# Patient Record
Sex: FEMALE | Race: BLACK OR AFRICAN AMERICAN | NOT HISPANIC OR LATINO | Employment: FULL TIME | URBAN - METROPOLITAN AREA
[De-identification: names, ages, dates, MRNs, and addresses within clinical notes are randomized per-mention and may not be internally consistent; named-entity substitution may affect disease eponyms.]

---

## 2017-06-01 ENCOUNTER — APPOINTMENT (OUTPATIENT)
Dept: GENERAL RADIOLOGY | Facility: HOSPITAL | Age: 27
End: 2017-06-01

## 2017-06-01 ENCOUNTER — HOSPITAL ENCOUNTER (EMERGENCY)
Facility: HOSPITAL | Age: 27
Discharge: HOME | End: 2017-06-01
Attending: EMERGENCY MEDICINE | Admitting: EMERGENCY MEDICINE

## 2017-06-01 VITALS
SYSTOLIC BLOOD PRESSURE: 111 MMHG | BODY MASS INDEX: 32.78 KG/M2 | OXYGEN SATURATION: 99 % | RESPIRATION RATE: 16 BRPM | HEIGHT: 63 IN | WEIGHT: 185 LBS | DIASTOLIC BLOOD PRESSURE: 78 MMHG | TEMPERATURE: 99.2 F | HEART RATE: 80 BPM

## 2017-06-01 DIAGNOSIS — N30.90 CYSTITIS: ICD-10-CM

## 2017-06-01 DIAGNOSIS — J06.9 UPPER RESPIRATORY TRACT INFECTION, UNSPECIFIED TYPE: Primary | ICD-10-CM

## 2017-06-01 LAB
ALBUMIN SERPL-MCNC: 4.3 G/DL (ref 3.2–4.8)
ALBUMIN/GLOB SERPL: 1.4 G/DL (ref 1.5–2.5)
ALP SERPL-CCNC: 45 U/L (ref 25–100)
ALT SERPL W P-5'-P-CCNC: 14 U/L (ref 7–40)
ANION GAP SERPL CALCULATED.3IONS-SCNC: 2 MMOL/L (ref 3–11)
AST SERPL-CCNC: 18 U/L (ref 0–33)
B-HCG UR QL: NEGATIVE
BACTERIA UR QL AUTO: ABNORMAL /HPF
BASOPHILS # BLD AUTO: 0.02 10*3/MM3 (ref 0–0.2)
BASOPHILS NFR BLD AUTO: 0.4 % (ref 0–1)
BILIRUB SERPL-MCNC: 0.5 MG/DL (ref 0.3–1.2)
BILIRUB UR QL STRIP: NEGATIVE
BUN BLD-MCNC: 9 MG/DL (ref 9–23)
BUN/CREAT SERPL: 12.9 (ref 7–25)
CALCIUM SPEC-SCNC: 9.3 MG/DL (ref 8.7–10.4)
CHLORIDE SERPL-SCNC: 108 MMOL/L (ref 99–109)
CLARITY UR: ABNORMAL
CO2 SERPL-SCNC: 24 MMOL/L (ref 20–31)
COLOR UR: YELLOW
CREAT BLD-MCNC: 0.7 MG/DL (ref 0.6–1.3)
DEPRECATED RDW RBC AUTO: 45.2 FL (ref 37–54)
EOSINOPHIL # BLD AUTO: 0.12 10*3/MM3 (ref 0.1–0.3)
EOSINOPHIL NFR BLD AUTO: 2.4 % (ref 0–3)
ERYTHROCYTE [DISTWIDTH] IN BLOOD BY AUTOMATED COUNT: 13.3 % (ref 11.3–14.5)
FLUAV AG NPH QL: NEGATIVE
FLUBV AG NPH QL IA: NEGATIVE
GFR SERPL CREATININE-BSD FRML MDRD: 121 ML/MIN/1.73
GLOBULIN UR ELPH-MCNC: 3.1 GM/DL
GLUCOSE BLD-MCNC: 104 MG/DL (ref 70–100)
GLUCOSE UR STRIP-MCNC: NEGATIVE MG/DL
HCT VFR BLD AUTO: 41.1 % (ref 34.5–44)
HGB BLD-MCNC: 13.5 G/DL (ref 11.5–15.5)
HGB UR QL STRIP.AUTO: NEGATIVE
HYALINE CASTS UR QL AUTO: ABNORMAL /LPF
IMM GRANULOCYTES # BLD: 0 10*3/MM3 (ref 0–0.03)
IMM GRANULOCYTES NFR BLD: 0 % (ref 0–0.6)
INTERNAL NEGATIVE CONTROL: NEGATIVE
INTERNAL POSITIVE CONTROL: POSITIVE
KETONES UR QL STRIP: ABNORMAL
LEUKOCYTE ESTERASE UR QL STRIP.AUTO: ABNORMAL
LYMPHOCYTES # BLD AUTO: 1.88 10*3/MM3 (ref 0.6–4.8)
LYMPHOCYTES NFR BLD AUTO: 37.9 % (ref 24–44)
Lab: NORMAL
MCH RBC QN AUTO: 30.2 PG (ref 27–31)
MCHC RBC AUTO-ENTMCNC: 32.8 G/DL (ref 32–36)
MCV RBC AUTO: 91.9 FL (ref 80–99)
MONOCYTES # BLD AUTO: 0.42 10*3/MM3 (ref 0–1)
MONOCYTES NFR BLD AUTO: 8.5 % (ref 0–12)
NEUTROPHILS # BLD AUTO: 2.52 10*3/MM3 (ref 1.5–8.3)
NEUTROPHILS NFR BLD AUTO: 50.8 % (ref 41–71)
NITRITE UR QL STRIP: NEGATIVE
PH UR STRIP.AUTO: 6 [PH] (ref 5–8)
PLATELET # BLD AUTO: 199 10*3/MM3 (ref 150–450)
PMV BLD AUTO: 9.8 FL (ref 6–12)
POTASSIUM BLD-SCNC: 3.7 MMOL/L (ref 3.5–5.5)
PROT SERPL-MCNC: 7.4 G/DL (ref 5.7–8.2)
PROT UR QL STRIP: NEGATIVE
RBC # BLD AUTO: 4.47 10*6/MM3 (ref 3.89–5.14)
RBC # UR: ABNORMAL /HPF
REF LAB TEST METHOD: ABNORMAL
S PYO AG THROAT QL: NEGATIVE
SODIUM BLD-SCNC: 134 MMOL/L (ref 132–146)
SP GR UR STRIP: 1.02 (ref 1–1.03)
SQUAMOUS #/AREA URNS HPF: ABNORMAL /HPF
UROBILINOGEN UR QL STRIP: ABNORMAL
WBC NRBC COR # BLD: 4.96 10*3/MM3 (ref 3.5–10.8)
WBC UR QL AUTO: ABNORMAL /HPF

## 2017-06-01 PROCEDURE — 99284 EMERGENCY DEPT VISIT MOD MDM: CPT

## 2017-06-01 PROCEDURE — 94640 AIRWAY INHALATION TREATMENT: CPT

## 2017-06-01 PROCEDURE — 85025 COMPLETE CBC W/AUTO DIFF WBC: CPT | Performed by: NURSE PRACTITIONER

## 2017-06-01 PROCEDURE — 87086 URINE CULTURE/COLONY COUNT: CPT | Performed by: NURSE PRACTITIONER

## 2017-06-01 PROCEDURE — 87880 STREP A ASSAY W/OPTIC: CPT | Performed by: NURSE PRACTITIONER

## 2017-06-01 PROCEDURE — 99283 EMERGENCY DEPT VISIT LOW MDM: CPT

## 2017-06-01 PROCEDURE — 87804 INFLUENZA ASSAY W/OPTIC: CPT | Performed by: NURSE PRACTITIONER

## 2017-06-01 PROCEDURE — 81001 URINALYSIS AUTO W/SCOPE: CPT | Performed by: NURSE PRACTITIONER

## 2017-06-01 PROCEDURE — 80053 COMPREHEN METABOLIC PANEL: CPT | Performed by: NURSE PRACTITIONER

## 2017-06-01 PROCEDURE — 71020 HC CHEST PA AND LATERAL: CPT

## 2017-06-01 PROCEDURE — 87081 CULTURE SCREEN ONLY: CPT | Performed by: NURSE PRACTITIONER

## 2017-06-01 RX ORDER — IPRATROPIUM BROMIDE AND ALBUTEROL SULFATE 2.5; .5 MG/3ML; MG/3ML
3 SOLUTION RESPIRATORY (INHALATION) ONCE
Status: COMPLETED | OUTPATIENT
Start: 2017-06-01 | End: 2017-06-01

## 2017-06-01 RX ORDER — CEFDINIR 300 MG/1
300 CAPSULE ORAL 2 TIMES DAILY
Qty: 20 CAPSULE | Refills: 0 | Status: SHIPPED | OUTPATIENT
Start: 2017-06-01 | End: 2019-10-10

## 2017-06-01 RX ORDER — SODIUM CHLORIDE 0.9 % (FLUSH) 0.9 %
10 SYRINGE (ML) INJECTION AS NEEDED
Status: DISCONTINUED | OUTPATIENT
Start: 2017-06-01 | End: 2017-06-01 | Stop reason: HOSPADM

## 2017-06-01 RX ADMIN — SODIUM CHLORIDE 1000 ML: 9 INJECTION, SOLUTION INTRAVENOUS at 10:19

## 2017-06-01 RX ADMIN — IPRATROPIUM BROMIDE AND ALBUTEROL SULFATE 3 ML: .5; 3 SOLUTION RESPIRATORY (INHALATION) at 09:42

## 2017-06-01 NOTE — DISCHARGE INSTRUCTIONS
Follow up with one of the [facility] physician groups below to setup primary care. If you have trouble following up, please call Audrey Benites, our transitional care nurse, at [phone].    (Dr. Mojica, Dr. Gibbs, Dr. Damon, and Dr. Ramon.)  [facility], Primary Care, [phone], [address], [place] [address]    [facility], Primary Care, [phone], [address] [facility], [address] [place], [address] [facility]) [facility] Medical [facility], Primary Care, [phone], [address] [facility] [address] [facility], [address] [place], [address] [facility], Primary Care, [phone], [address] [facility], [address] [place], [address]     [place] [address] [facility] Medical [facility], Primary Care, [phone], [address] [facility], [address] [place], [address]    [place] 2 [facility], Primary Care, [phone], [address], [place], [address]    [place], [address] [facility], Primary Care, [phone], [address] [facility], [address] [place], [address] [facility] Medical [facility], Primary Care, [phone], [address] [facility], [address] [place], [address] [facility]) [facility] Medical [facility], Primary Care, [phone], [address], [address] [place], [address] [facility] Medical [facility], Primary Care, [phone], [address] [facility], [address] [place], [address] [facility], Primary Care, [phone], [address] [facility], [place], [address]     [facility], Primary Care, [phone], [address] [place] [address]    Follow up with one of the physician centers below to setup primary care.    [facility], [phone],  [address] [facility] [address] [facility], [address], [place], [facility]    [facility], [phone], [address] [facility], [address] [place] [address], [phone], [address] [facility] [address] [place], [facility];     [facility], [phone], [address] [place] [address]

## 2017-06-01 NOTE — ED PROVIDER NOTES
Subjective   Patient is a 26 y.o. female presenting with URI.   URI   Presenting symptoms: congestion, cough, ear pain, fever and sore throat    Severity:  Moderate  Onset quality:  Gradual  Timing:  Constant  Progression:  Worsening  Chronicity:  New  Worsened by:  Movement  Associated symptoms: sinus pain, sneezing and wheezing    Associated symptoms: no headaches and no neck pain    Risk factors: sick contacts        Review of Systems   Constitutional: Positive for fever.   HENT: Positive for congestion, ear pain, sneezing and sore throat.    Respiratory: Positive for cough and wheezing.    Musculoskeletal: Negative for neck pain.   Neurological: Negative for headaches.   All other systems reviewed and are negative.      Past Medical History:   Diagnosis Date   • Depression    • Heart disease        No Known Allergies    Past Surgical History:   Procedure Laterality Date   •  SECTION     • TUMOR REMOVAL      NECK   • TUMOR REMOVAL         Family History   Problem Relation Age of Onset   • Diabetes Father    • Thyroid disease Father    • Sickle cell anemia Father        Social History     Social History   • Marital status: Single     Spouse name: N/A   • Number of children: N/A   • Years of education: N/A     Social History Main Topics   • Smoking status: Current Some Day Smoker     Packs/day: 0.50   • Smokeless tobacco: None      Comment: social   • Alcohol use Yes      Comment: RARELY   • Drug use: Yes     Special: Marijuana      Comment: OCCASIONAL- NOT IN 6+MONTHS   • Sexual activity: Yes     Partners: Male     Other Topics Concern   • None     Social History Narrative           Objective   Physical Exam   Constitutional: She is oriented to person, place, and time. She appears well-developed and well-nourished.   HENT:   Head: Normocephalic and atraumatic.   Right Ear: External ear normal. Tympanic membrane is bulging.   Left Ear: External ear normal. Tympanic membrane is bulging.   Nose: Rhinorrhea  present. Right sinus exhibits maxillary sinus tenderness and frontal sinus tenderness. Left sinus exhibits maxillary sinus tenderness and frontal sinus tenderness.   Mouth/Throat: Oropharynx is clear and moist.   Eyes: Conjunctivae and EOM are normal. Pupils are equal, round, and reactive to light.   Neck: Normal range of motion. Neck supple.   Cardiovascular: Normal rate, regular rhythm, normal heart sounds and intact distal pulses.    Pulmonary/Chest: Effort normal. She has wheezes.   Abdominal: Soft. Bowel sounds are normal.   Musculoskeletal: Normal range of motion.   Neurological: She is alert and oriented to person, place, and time.   Skin: Skin is warm and dry.   Psychiatric: She has a normal mood and affect. Her behavior is normal. Judgment and thought content normal.       Procedures         ED Course  ED Course   Comment By Time   Well aware of the ss of worsening condition. All thankful and agreeable. BETHANY Rodriguez 06/01 1129                  [facility]    Final diagnoses:   Upper respiratory tract infection, unspecified type   Cystitis            BETHANY Rodriguez  06/01/17 1132

## 2017-06-03 LAB
BACTERIA SPEC AEROBE CULT: NORMAL
BACTERIA SPEC AEROBE CULT: NORMAL

## 2017-07-31 ENCOUNTER — HOSPITAL ENCOUNTER (EMERGENCY)
Facility: HOSPITAL | Age: 27
Discharge: HOME | End: 2017-07-31
Attending: EMERGENCY MEDICINE | Admitting: EMERGENCY MEDICINE

## 2017-07-31 ENCOUNTER — APPOINTMENT (OUTPATIENT)
Dept: CT IMAGING | Facility: HOSPITAL | Age: 27
End: 2017-07-31

## 2017-07-31 VITALS
TEMPERATURE: 98.7 F | SYSTOLIC BLOOD PRESSURE: 94 MMHG | BODY MASS INDEX: 31.01 KG/M2 | OXYGEN SATURATION: 100 % | HEIGHT: 63 IN | HEART RATE: 65 BPM | RESPIRATION RATE: 16 BRPM | DIASTOLIC BLOOD PRESSURE: 51 MMHG | WEIGHT: 175 LBS

## 2017-07-31 DIAGNOSIS — R42 ORTHOSTATIC DIZZINESS: Primary | ICD-10-CM

## 2017-07-31 LAB
ALBUMIN SERPL-MCNC: 4.1 G/DL (ref 3.2–4.8)
ALBUMIN/GLOB SERPL: 1.4 G/DL (ref 1.5–2.5)
ALP SERPL-CCNC: 41 U/L (ref 25–100)
ALT SERPL W P-5'-P-CCNC: 14 U/L (ref 7–40)
ANION GAP SERPL CALCULATED.3IONS-SCNC: 4 MMOL/L (ref 3–11)
AST SERPL-CCNC: 12 U/L (ref 0–33)
B-HCG UR QL: NEGATIVE
BACTERIA UR QL AUTO: ABNORMAL /HPF
BASOPHILS # BLD AUTO: 0.01 10*3/MM3 (ref 0–0.2)
BASOPHILS NFR BLD AUTO: 0.2 % (ref 0–1)
BILIRUB SERPL-MCNC: 0.3 MG/DL (ref 0.3–1.2)
BILIRUB UR QL STRIP: NEGATIVE
BUN BLD-MCNC: 8 MG/DL (ref 9–23)
BUN/CREAT SERPL: 13.3 (ref 7–25)
CALCIUM SPEC-SCNC: 8.9 MG/DL (ref 8.7–10.4)
CHLORIDE SERPL-SCNC: 111 MMOL/L (ref 99–109)
CLARITY UR: CLEAR
CO2 SERPL-SCNC: 26 MMOL/L (ref 20–31)
COLOR UR: YELLOW
CREAT BLD-MCNC: 0.6 MG/DL (ref 0.6–1.3)
DEPRECATED RDW RBC AUTO: 44.2 FL (ref 37–54)
EOSINOPHIL # BLD AUTO: 0.2 10*3/MM3 (ref 0–0.3)
EOSINOPHIL NFR BLD AUTO: 3.8 % (ref 0–3)
ERYTHROCYTE [DISTWIDTH] IN BLOOD BY AUTOMATED COUNT: 13.1 % (ref 11.3–14.5)
GFR SERPL CREATININE-BSD FRML MDRD: 144 ML/MIN/1.73
GLOBULIN UR ELPH-MCNC: 3 GM/DL
GLUCOSE BLD-MCNC: 96 MG/DL (ref 70–100)
GLUCOSE UR STRIP-MCNC: NEGATIVE MG/DL
HCT VFR BLD AUTO: 39 % (ref 34.5–44)
HGB BLD-MCNC: 12.9 G/DL (ref 11.5–15.5)
HGB UR QL STRIP.AUTO: NEGATIVE
HOLD SPECIMEN: NORMAL
HOLD SPECIMEN: NORMAL
HYALINE CASTS UR QL AUTO: ABNORMAL /LPF
IMM GRANULOCYTES # BLD: 0 10*3/MM3 (ref 0–0.03)
IMM GRANULOCYTES NFR BLD: 0 % (ref 0–0.6)
INTERNAL NEGATIVE CONTROL: NEGATIVE
INTERNAL POSITIVE CONTROL: POSITIVE
KETONES UR QL STRIP: NEGATIVE
LEUKOCYTE ESTERASE UR QL STRIP.AUTO: ABNORMAL
LYMPHOCYTES # BLD AUTO: 1.82 10*3/MM3 (ref 0.6–4.8)
LYMPHOCYTES NFR BLD AUTO: 34.3 % (ref 24–44)
Lab: NORMAL
MCH RBC QN AUTO: 30.5 PG (ref 27–31)
MCHC RBC AUTO-ENTMCNC: 33.1 G/DL (ref 32–36)
MCV RBC AUTO: 92.2 FL (ref 80–99)
MONOCYTES # BLD AUTO: 0.35 10*3/MM3 (ref 0–1)
MONOCYTES NFR BLD AUTO: 6.6 % (ref 0–12)
NEUTROPHILS # BLD AUTO: 2.92 10*3/MM3 (ref 1.5–8.3)
NEUTROPHILS NFR BLD AUTO: 55.1 % (ref 41–71)
NITRITE UR QL STRIP: NEGATIVE
PH UR STRIP.AUTO: 7.5 [PH] (ref 5–8)
PLATELET # BLD AUTO: 218 10*3/MM3 (ref 150–450)
PMV BLD AUTO: 10.6 FL (ref 6–12)
POTASSIUM BLD-SCNC: 3.8 MMOL/L (ref 3.5–5.5)
PROT SERPL-MCNC: 7.1 G/DL (ref 5.7–8.2)
PROT UR QL STRIP: NEGATIVE
RBC # BLD AUTO: 4.23 10*6/MM3 (ref 3.89–5.14)
RBC # UR: ABNORMAL /HPF
REF LAB TEST METHOD: ABNORMAL
SODIUM BLD-SCNC: 141 MMOL/L (ref 132–146)
SP GR UR STRIP: 1.01 (ref 1–1.03)
SQUAMOUS #/AREA URNS HPF: ABNORMAL /HPF
TROPONIN I SERPL-MCNC: 0 NG/ML (ref 0–0.07)
UROBILINOGEN UR QL STRIP: ABNORMAL
WBC NRBC COR # BLD: 5.3 10*3/MM3 (ref 3.5–10.8)
WBC UR QL AUTO: ABNORMAL /HPF
WHOLE BLOOD HOLD SPECIMEN: NORMAL
WHOLE BLOOD HOLD SPECIMEN: NORMAL

## 2017-07-31 PROCEDURE — 93005 ELECTROCARDIOGRAM TRACING: CPT | Performed by: EMERGENCY MEDICINE

## 2017-07-31 PROCEDURE — 96360 HYDRATION IV INFUSION INIT: CPT

## 2017-07-31 PROCEDURE — 99284 EMERGENCY DEPT VISIT MOD MDM: CPT

## 2017-07-31 PROCEDURE — 70450 CT HEAD/BRAIN W/O DYE: CPT

## 2017-07-31 PROCEDURE — 84484 ASSAY OF TROPONIN QUANT: CPT

## 2017-07-31 PROCEDURE — 87086 URINE CULTURE/COLONY COUNT: CPT | Performed by: NURSE PRACTITIONER

## 2017-07-31 PROCEDURE — 80053 COMPREHEN METABOLIC PANEL: CPT | Performed by: NURSE PRACTITIONER

## 2017-07-31 PROCEDURE — 85025 COMPLETE CBC W/AUTO DIFF WBC: CPT | Performed by: NURSE PRACTITIONER

## 2017-07-31 PROCEDURE — 81001 URINALYSIS AUTO W/SCOPE: CPT | Performed by: NURSE PRACTITIONER

## 2017-07-31 PROCEDURE — 96361 HYDRATE IV INFUSION ADD-ON: CPT

## 2017-07-31 RX ADMIN — SODIUM CHLORIDE 1000 ML: 9 INJECTION, SOLUTION INTRAVENOUS at 11:36

## 2017-08-02 LAB — BACTERIA SPEC AEROBE CULT: NORMAL

## 2017-09-13 ENCOUNTER — HOSPITAL ENCOUNTER (EMERGENCY)
Facility: HOSPITAL | Age: 27
Discharge: HOME | End: 2017-09-13
Attending: EMERGENCY MEDICINE | Admitting: EMERGENCY MEDICINE

## 2017-09-13 ENCOUNTER — APPOINTMENT (OUTPATIENT)
Dept: GENERAL RADIOLOGY | Facility: HOSPITAL | Age: 27
End: 2017-09-13

## 2017-09-13 ENCOUNTER — HOSPITAL ENCOUNTER (EMERGENCY)
Facility: HOSPITAL | Age: 27
End: 2017-09-13

## 2017-09-13 VITALS
RESPIRATION RATE: 18 BRPM | HEART RATE: 75 BPM | TEMPERATURE: 98.6 F | BODY MASS INDEX: 29.23 KG/M2 | WEIGHT: 165 LBS | SYSTOLIC BLOOD PRESSURE: 110 MMHG | DIASTOLIC BLOOD PRESSURE: 88 MMHG | HEIGHT: 63 IN | OXYGEN SATURATION: 98 %

## 2017-09-13 DIAGNOSIS — M54.32 SCIATICA OF LEFT SIDE: Primary | ICD-10-CM

## 2017-09-13 LAB
B-HCG UR QL: NEGATIVE
BILIRUB BLD-MCNC: NEGATIVE MG/DL
CLARITY, POC: ABNORMAL
COLOR UR: YELLOW
GLUCOSE UR STRIP-MCNC: NEGATIVE MG/DL
INTERNAL NEGATIVE CONTROL: NEGATIVE
INTERNAL POSITIVE CONTROL: POSITIVE
KETONES UR QL: NEGATIVE
LEUKOCYTE EST, POC: NEGATIVE
Lab: NORMAL
NITRITE UR-MCNC: POSITIVE MG/ML
PH UR: 8.5 [PH] (ref 5–8)
PROT UR STRIP-MCNC: NEGATIVE MG/DL
RBC # UR STRIP: NEGATIVE /UL
SP GR UR: 1.01 (ref 1–1.03)
UROBILINOGEN UR QL: NORMAL

## 2017-09-13 PROCEDURE — 81002 URINALYSIS NONAUTO W/O SCOPE: CPT | Performed by: EMERGENCY MEDICINE

## 2017-09-13 PROCEDURE — 99283 EMERGENCY DEPT VISIT LOW MDM: CPT

## 2017-09-13 PROCEDURE — 72100 X-RAY EXAM L-S SPINE 2/3 VWS: CPT

## 2017-09-13 PROCEDURE — 73502 X-RAY EXAM HIP UNI 2-3 VIEWS: CPT

## 2017-09-13 RX ORDER — CYCLOBENZAPRINE HCL 10 MG
10 TABLET ORAL 3 TIMES DAILY PRN
Qty: 12 TABLET | Refills: 0 | Status: SHIPPED | OUTPATIENT
Start: 2017-09-13

## 2017-09-13 RX ORDER — PREDNISONE 20 MG/1
TABLET ORAL
Qty: 18 TABLET | Refills: 0 | Status: SHIPPED | OUTPATIENT
Start: 2017-09-13 | End: 2019-10-10

## 2017-09-13 NOTE — ED PROVIDER NOTES
Subjective   Patient is a 27 y.o. female presenting with back pain.   History provided by:  Patient   used: No    Back Pain   Location:  Thoracic spine and gluteal region  Quality:  Burning, shooting and stabbing  Radiates to:  L posterior upper leg and L thigh  Pain severity:  Moderate  Pain is:  Same all the time  Onset quality:  Sudden  Timing:  Constant  Progression:  Worsening  Chronicity:  Recurrent  Context: not emotional stress, not jumping from heights, not lifting heavy objects, not MVA, not occupational injury, not recent illness and not recent injury    Context comment:  Had a hip dislocation on the side years ago she is concerned me this is somewhat connected  Relieved by:  Being still  Worsened by:  Movement  Associated symptoms: leg pain and tingling    Associated symptoms: no abdominal pain, no bladder incontinence, no bowel incontinence, no chest pain, no dysuria, no fever, no headaches, no numbness, no perianal numbness and no weight loss    Risk factors: no hx of cancer, not obese, no steroid use and no vascular disease        Review of Systems   Constitutional: Negative for chills, fever and weight loss.   HENT: Negative for ear pain and rhinorrhea.    Respiratory: Negative for chest tightness and shortness of breath.    Cardiovascular: Negative for chest pain, palpitations and leg swelling.   Gastrointestinal: Negative for abdominal pain, bowel incontinence, nausea and vomiting.   Genitourinary: Negative for bladder incontinence, dysuria, flank pain, hematuria and urgency.   Musculoskeletal: Positive for back pain. Negative for neck pain.   Skin: Negative for pallor and rash.   Neurological: Positive for tingling. Negative for numbness and headaches.   Psychiatric/Behavioral: Negative.    All other systems reviewed and are negative.      Past Medical History:   Diagnosis Date   • Depression    • Heart disease        No Known Allergies    Past Surgical History:   Procedure  Laterality Date   •  SECTION     • TUMOR REMOVAL      NECK   • TUMOR REMOVAL         Family History   Problem Relation Age of Onset   • Diabetes Father    • Thyroid disease Father    • Sickle cell anemia Father        Social History     Social History   • Marital status: Single     Spouse name: N/A   • Number of children: N/A   • Years of education: N/A     Social History Main Topics   • Smoking status: Current Some Day Smoker     Packs/day: 0.50   • Smokeless tobacco: None      Comment: social   • Alcohol use Yes      Comment: RARELY   • Drug use: Yes     Special: Marijuana      Comment: OCCASIONAL- NOT IN 6+MONTHS   • Sexual activity: Yes     Partners: Male     Other Topics Concern   • None     Social History Narrative           Objective   Physical Exam   Constitutional: She is oriented to person, place, and time. She appears well-developed and well-nourished.   HENT:   Head: Normocephalic and atraumatic.   Right Ear: External ear normal.   Left Ear: External ear normal.   Nose: Nose normal.   Mouth/Throat: Oropharynx is clear and moist.   Eyes: Conjunctivae and EOM are normal. Pupils are equal, round, and reactive to light. No scleral icterus.   Neck: Normal range of motion. No thyromegaly present.   Cardiovascular: Normal rate, regular rhythm and normal heart sounds.    Pulmonary/Chest: Effort normal and breath sounds normal. No respiratory distress. She has no wheezes. She has no rales. She exhibits no tenderness.   Abdominal: Soft. Bowel sounds are normal. She exhibits no distension. There is no tenderness.   Musculoskeletal: Normal range of motion.   Tender in the left SI region is no rash no lesions.  Full range of motion with no crepitus.   Lymphadenopathy:     She has no cervical adenopathy.   Neurological: She is alert and oriented to person, place, and time. She has normal reflexes. She displays normal reflexes. No cranial nerve deficit. Coordination normal.   Skin: Skin is warm and dry.    Psychiatric: She has a normal mood and affect. Her behavior is normal. Judgment and thought content normal.   Nursing note and vitals reviewed.      Procedures         ED Course  ED Course                  MDM  Number of Diagnoses or Management Options  new and requires workup     Amount and/or Complexity of Data Reviewed  Tests in the radiology section of CPT®: reviewed and ordered  Discuss the patient with other providers: yes    Patient Progress  Patient progress: stable      Final diagnoses:   Sciatica of left side            DARCIE Low  09/16/17 0719

## 2017-09-13 NOTE — DISCHARGE INSTRUCTIONS
Follow up with one of the physician centers below to setup primary care.    [facility], [facility], [phone], [address] [facility], [address], [place], [address]    [facility], [phone], [address] [facility], [address]    [facility], [phone], [address] [facility] [address] [place], [address];     [facility], [phone], [address] [facility], [address]    Follow up with one of the [facility] physician groups below to setup primary care. If you have trouble following up, please call Audrey Benites, our transitional care nurse, at [phone].    (Dr. Mojica, Dr. Gibbs, Dr. Damon, and Dr. Ramon.)  [facility], Primary Care, [phone], [address], [place] [address]    [facility], Primary Care, [phone], [address] [facility], [address] [place], [address] [facility], Primary Care, [phone], [address] [facility], [address] [place], [address] [facility] Medical [facility], Primary Care, [phone], [address] [facility], [address] [place], [address]     [place] [facility], Primary Care, [phone], [address] [facility], [address] [place], [address]    [place] [facility], Primary Care, [phone], [address], [place], [address]    [place], [address] [facility], Primary Care, [phone], [address] [facility], [address] [place], [address] [facility], Primary Care, [phone], [address] [facility], [address] [place], [address] [facility]) [facility] Medical [facility], Primary Care, [phone], [address] [facility], [address] [place], [address] [facility]  [facility], Primary Care, [phone], [address], [address] [place], [address] [place] [address]     ([facility], Primary Care, [phone], [address], [place], [address] [place] [address], Primary Care, [phone], [address] [facility], [place] [address] [place] [address]

## 2019-10-10 ENCOUNTER — OFFICE VISIT (OUTPATIENT)
Dept: RETAIL CLINIC | Facility: CLINIC | Age: 29
End: 2019-10-10

## 2019-10-10 VITALS
SYSTOLIC BLOOD PRESSURE: 102 MMHG | TEMPERATURE: 98.5 F | HEART RATE: 88 BPM | HEIGHT: 63 IN | WEIGHT: 192.8 LBS | RESPIRATION RATE: 20 BRPM | DIASTOLIC BLOOD PRESSURE: 78 MMHG | BODY MASS INDEX: 34.16 KG/M2 | OXYGEN SATURATION: 99 %

## 2019-10-10 DIAGNOSIS — J06.9 VIRAL UPPER RESPIRATORY TRACT INFECTION: Primary | ICD-10-CM

## 2019-10-10 PROCEDURE — 99213 OFFICE O/P EST LOW 20 MIN: CPT | Performed by: NURSE PRACTITIONER

## 2019-10-10 RX ORDER — DULOXETIN HYDROCHLORIDE 60 MG/1
CAPSULE, DELAYED RELEASE ORAL
COMMUNITY
Start: 2019-08-29

## 2019-10-10 RX ORDER — CYCLOBENZAPRINE HCL 5 MG
TABLET ORAL
COMMUNITY
Start: 2019-08-13 | End: 2019-10-10

## 2019-10-10 RX ORDER — SUMATRIPTAN 100 MG/1
TABLET, FILM COATED ORAL
COMMUNITY
Start: 2019-10-08

## 2019-10-10 RX ORDER — GUAIFENESIN 600 MG/1
1200 TABLET, EXTENDED RELEASE ORAL 2 TIMES DAILY
Qty: 40 TABLET | Refills: 0 | Status: SHIPPED | OUTPATIENT
Start: 2019-10-10

## 2019-10-10 NOTE — PATIENT INSTRUCTIONS
"Upper Respiratory Infection, Adult  An upper respiratory infection (URI) affects the nose, throat, and upper air passages. URIs are caused by germs (viruses). The most common type of URI is often called \"the common cold.\"  Medicines cannot cure URIs, but you can do things at home to relieve your symptoms. URIs usually get better within 7-10 days.  Follow these instructions at home:  Activity  · Rest as needed.  · If you have a fever, stay home from work or school until your fever is gone, or until your doctor says you may return to work or school.  ? You should stay home until you cannot spread the infection anymore (you are not contagious).  ? Your doctor may have you wear a face mask so you have less risk of spreading the infection.  Relieving symptoms  · Gargle with a salt-water mixture 3-4 times a day or as needed. To make a salt-water mixture, completely dissolve ½-1 tsp of salt in 1 cup of warm water.  · Use a cool-mist humidifier to add moisture to the air. This can help you breathe more easily.  Eating and drinking    · Drink enough fluid to keep your pee (urine) pale yellow.  · Eat soups and other clear broths.  General instructions    · Take over-the-counter and prescription medicines only as told by your doctor. These include cold medicines, fever reducers, and cough suppressants.  · Do not use any products that contain nicotine or tobacco. These include cigarettes and e-cigarettes. If you need help quitting, ask your doctor.  · Avoid being where people are smoking (avoid secondhand smoke).  · Make sure you get regular shots and get the flu shot every year.  · Keep all follow-up visits as told by your doctor. This is important.  How to avoid spreading infection to others    · Wash your hands often with soap and water. If you do not have soap and water, use hand .  · Avoid touching your mouth, face, eyes, or nose.  · Cough or sneeze into a tissue or your sleeve or elbow. Do not cough or sneeze " "into your hand or into the air.  Contact a doctor if:  · You are getting worse, not better.  · You have any of these:  ? A fever.  ? Chills.  ? Brown or red mucus in your nose.  ? Yellow or brown fluid (discharge)coming from your nose.  ? Pain in your face, especially when you bend forward.  ? Swollen neck glands.  ? Pain with swallowing.  ? White areas in the back of your throat.  Get help right away if:  · You have shortness of breath that gets worse.  · You have very bad or constant:  ? Headache.  ? Ear pain.  ? Pain in your forehead, behind your eyes, and over your cheekbones (sinus pain).  ? Chest pain.  · You have long-lasting (chronic) lung disease along with any of these:  ? Wheezing.  ? Long-lasting cough.  ? Coughing up blood.  ? A change in your usual mucus.  · You have a stiff neck.  · You have changes in your:  ? Vision.  ? Hearing.  ? Thinking.  ? Mood.  Summary  · An upper respiratory infection (URI) is caused by a germ called a virus. The most common type of URI is often called \"the common cold.\"  · URIs usually get better within 7-10 days.  · Take over-the-counter and prescription medicines only as told by your doctor.  This information is not intended to replace advice given to you by your health care provider. Make sure you discuss any questions you have with your health care provider.  Document Released: 06/05/2009 Document Revised: 08/10/2018 Document Reviewed: 08/10/2018  [employer] Patient Education © 2019 [employer]    "

## 2019-10-15 NOTE — PROGRESS NOTES
"Conner Sheppard is a 29 y.o. female.   Chief Complaint   Patient presents with   • URI      28 yo female presents with complaint of nasal congestion, raw throat, and ear fullness duration of 2 days.  Refer to HPI/ROS for additional information.      URI    This is a new problem. Episode onset: 2 days. The problem has been waxing and waning. There has been no fever. Associated symptoms include congestion (thick), a plugged ear sensation and a sore throat. Pertinent negatives include no ear pain (fullness) or sinus pain.        The following portions of the patient's history were reviewed and updated as appropriate: allergies, current medications, past family history, past medical history, past social history, past surgical history and problem list.    Current Outpatient Medications:   •  cyclobenzaprine (FLEXERIL) 10 MG tablet, Take 1 tablet by mouth 3 (Three) Times a Day As Needed for Muscle Spasms., Disp: 12 tablet, Rfl: 0  •  diclofenac (VOLTAREN) 50 MG EC tablet, Take 1 tablet by mouth 3 (Three) Times a Day., Disp: 15 tablet, Rfl: 0  •  DULoxetine (CYMBALTA) 60 MG capsule, , Disp: , Rfl:   •  guaiFENesin (MUCINEX) 600 MG 12 hr tablet, Take 2 tablets by mouth 2 (Two) Times a Day., Disp: 40 tablet, Rfl: 0  •  Prenatal Vit-Fe Fumarate-FA (PRENATAL VITAMIN 27-0.8) 27-0.8 MG tablet tablet, Take 1 tablet by mouth daily., Disp: , Rfl:   •  SUMAtriptan (IMITREX) 100 MG tablet, , Disp: , Rfl:     Review of Systems   Constitutional: Negative.    HENT: Positive for congestion (thick) and sore throat. Negative for ear pain (fullness), sinus pressure, sinus pain and trouble swallowing.    Eyes: Negative.    Respiratory: Negative.    Cardiovascular: Negative.    Gastrointestinal: Negative.    Skin: Negative.    Psychiatric/Behavioral: Negative.      /78   Pulse 88   Temp 98.5 °F (36.9 °C)   Resp 20   Ht 158.8 cm (62.5\")   Wt 87.5 kg (192 lb 12.8 oz)   SpO2 99%   BMI 34.70 kg/m²     Objective "   Allergies   Allergen Reactions   • Penicillins Hives     Pt reports allergy to penicillin today, noted she has been   • Latex Rash       Physical Exam   Constitutional: She is oriented to person, place, and time. She appears well-developed and well-nourished. No distress.   HENT:   Head: Normocephalic and atraumatic.   Right Ear: Hearing, tympanic membrane, external ear and ear canal normal.   Left Ear: Hearing, tympanic membrane, external ear and ear canal normal.   Nose: Mucosal edema present. Right sinus exhibits no maxillary sinus tenderness and no frontal sinus tenderness. Left sinus exhibits no maxillary sinus tenderness and no frontal sinus tenderness.   Mouth/Throat: Uvula is midline, oropharynx is clear and moist and mucous membranes are normal. Tonsils are 0 on the right. Tonsils are 0 on the left. No tonsillar exudate.   Eyes: Conjunctivae are normal.   Neck: Normal range of motion. Neck supple.   Cardiovascular: Normal rate, regular rhythm, normal heart sounds and intact distal pulses.   Pulmonary/Chest: Effort normal and breath sounds normal.   Neurological: She is alert and oriented to person, place, and time.   Skin: Skin is warm. Capillary refill takes less than 2 seconds. She is not diaphoretic.   Psychiatric: She has a normal mood and affect. Her behavior is normal. Judgment and thought content normal.   Vitals reviewed.      Assessment/Plan   Christa was seen today for uri.    Diagnoses and all orders for this visit:    Viral upper respiratory tract infection    Other orders  -     guaiFENesin (MUCINEX) 600 MG 12 hr tablet; Take 2 tablets by mouth 2 (Two) Times a Day.           An After Visit Summary was printed, reviewed, and given to the patient. Understanding verbalized and agrees with treatment plan.  If no improvement or becomes worse, follow up with primary or go to [facility].          October 15, 2019 9:55 AM

## 2024-05-23 ENCOUNTER — OFFICE VISIT (OUTPATIENT)
Dept: INTERNAL MEDICINE | Facility: CLINIC | Age: 34
End: 2024-05-23
Payer: COMMERCIAL

## 2024-05-23 VITALS
HEART RATE: 78 BPM | WEIGHT: 152 LBS | TEMPERATURE: 96.4 F | SYSTOLIC BLOOD PRESSURE: 96 MMHG | OXYGEN SATURATION: 100 % | HEIGHT: 64 IN | BODY MASS INDEX: 25.95 KG/M2 | DIASTOLIC BLOOD PRESSURE: 60 MMHG

## 2024-05-23 DIAGNOSIS — R00.2 PALPITATION: ICD-10-CM

## 2024-05-23 DIAGNOSIS — M79.7 FIBROMYALGIA: Primary | ICD-10-CM

## 2024-05-23 DIAGNOSIS — J30.2 SEASONAL ALLERGIES: ICD-10-CM

## 2024-05-23 DIAGNOSIS — G89.4 CHRONIC PAIN SYNDROME: ICD-10-CM

## 2024-05-23 DIAGNOSIS — I95.1 ORTHOSTATIC HYPOTENSION: ICD-10-CM

## 2024-05-23 DIAGNOSIS — G43.909 MIGRAINE WITHOUT STATUS MIGRAINOSUS, NOT INTRACTABLE, UNSPECIFIED MIGRAINE TYPE: ICD-10-CM

## 2024-05-23 PROCEDURE — 99214 OFFICE O/P EST MOD 30 MIN: CPT | Performed by: STUDENT IN AN ORGANIZED HEALTH CARE EDUCATION/TRAINING PROGRAM

## 2024-05-23 RX ORDER — ONDANSETRON 4 MG/1
8 TABLET, ORALLY DISINTEGRATING ORAL EVERY 8 HOURS PRN
COMMUNITY
Start: 2024-03-06

## 2024-05-23 RX ORDER — FEXOFENADINE HCL 60 MG/1
60 TABLET, FILM COATED ORAL DAILY
Qty: 30 TABLET | Refills: 11 | Status: SHIPPED | OUTPATIENT
Start: 2024-05-23

## 2024-05-23 RX ORDER — SUMATRIPTAN 100 MG/1
100 TABLET, FILM COATED ORAL ONCE
COMMUNITY
Start: 2024-03-15

## 2024-05-23 RX ORDER — GABAPENTIN 400 MG/1
400 CAPSULE ORAL 2 TIMES DAILY
Qty: 60 CAPSULE | Refills: 0 | Status: CANCELLED | OUTPATIENT
Start: 2024-05-23

## 2024-05-23 RX ORDER — MIDODRINE HYDROCHLORIDE 2.5 MG/1
2.5 TABLET ORAL 2 TIMES DAILY
COMMUNITY
Start: 2024-03-04

## 2024-05-23 RX ORDER — RIMEGEPANT SULFATE 75 MG/75MG
75 TABLET, ORALLY DISINTEGRATING ORAL
COMMUNITY
Start: 2024-02-15 | End: 2024-05-23

## 2024-05-23 RX ORDER — HYDROCODONE BITARTRATE AND ACETAMINOPHEN 10; 325 MG/1; MG/1
1 TABLET ORAL EVERY 8 HOURS PRN
Qty: 90 TABLET | Refills: 0 | Status: SHIPPED | OUTPATIENT
Start: 2024-05-23

## 2024-05-23 RX ORDER — FLUDROCORTISONE ACETATE 0.1 MG/1
0.1 TABLET ORAL DAILY PRN
Qty: 10 TABLET | Refills: 0 | Status: SHIPPED | OUTPATIENT
Start: 2024-05-23

## 2024-05-23 RX ORDER — HYDROCODONE BITARTRATE AND ACETAMINOPHEN 10; 325 MG/1; MG/1
1 TABLET ORAL EVERY 8 HOURS PRN
COMMUNITY
End: 2024-05-23 | Stop reason: SDUPTHER

## 2024-05-23 RX ORDER — CYCLOBENZAPRINE HCL 10 MG
10 TABLET ORAL 3 TIMES DAILY PRN
COMMUNITY
End: 2024-05-23 | Stop reason: SDUPTHER

## 2024-05-23 RX ORDER — FLUDROCORTISONE ACETATE 0.1 MG/1
0.1 TABLET ORAL DAILY
Qty: 10 TABLET | Refills: 0 | Status: CANCELLED | OUTPATIENT
Start: 2024-05-23

## 2024-05-23 RX ORDER — GABAPENTIN 400 MG/1
400 CAPSULE ORAL 2 TIMES DAILY
Qty: 60 CAPSULE | Refills: 2 | Status: SHIPPED | OUTPATIENT
Start: 2024-05-23

## 2024-05-23 RX ORDER — CYCLOBENZAPRINE HCL 10 MG
10 TABLET ORAL 3 TIMES DAILY PRN
Qty: 90 TABLET | Refills: 1 | Status: SHIPPED | OUTPATIENT
Start: 2024-05-23
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
## 2024-06-01 LAB — DRUGS UR: NORMAL

## 2024-06-06 ENCOUNTER — OFFICE VISIT (OUTPATIENT)
Dept: INTERNAL MEDICINE | Facility: CLINIC | Age: 34
End: 2024-06-06
Payer: COMMERCIAL

## 2024-06-06 VITALS
SYSTOLIC BLOOD PRESSURE: 86 MMHG | BODY MASS INDEX: 26.8 KG/M2 | HEART RATE: 92 BPM | DIASTOLIC BLOOD PRESSURE: 58 MMHG | TEMPERATURE: 96.4 F | HEIGHT: 64 IN | OXYGEN SATURATION: 100 % | WEIGHT: 157 LBS

## 2024-06-06 DIAGNOSIS — M79.7 FIBROMYALGIA: ICD-10-CM

## 2024-06-06 DIAGNOSIS — G89.29 CHRONIC NEUROPATHIC PAIN: Primary | ICD-10-CM

## 2024-06-06 DIAGNOSIS — I95.1 ORTHOSTATIC HYPOTENSION: ICD-10-CM

## 2024-06-06 DIAGNOSIS — M79.2 CHRONIC NEUROPATHIC PAIN: Primary | ICD-10-CM

## 2024-06-06 PROCEDURE — 99214 OFFICE O/P EST MOD 30 MIN: CPT | Performed by: STUDENT IN AN ORGANIZED HEALTH CARE EDUCATION/TRAINING PROGRAM

## 2024-06-06 RX ORDER — SODIUM CHLORIDE 1 G/1
1 TABLET ORAL 2 TIMES DAILY
Qty: 60 TABLET | Refills: 2 | Status: SHIPPED | OUTPATIENT
Start: 2024-06-06

## 2024-06-06 NOTE — PROGRESS NOTES
Office Note     Name: Christa Sheppard    : 1990     MRN: [id number]     Chief Complaint  Pain (Chronic pain f/u and wants to discuss paperwork /)    Subjective     History of Present Illness:  Christa Sheppard is a 33 y.o. female who presents today for     Follow up for Chronic pain, fibromyalgia, and medication refills.   She is currenty takes Norco 10-325mg daily, this has been a chronic condition. With this dose, she is able to function at her job, she works at [employer]. She is unable to lift more than >20 lbs at work. Her pain is manageable where is able to move. She does have her bad day still. Takes Flexeril PRN  Pain is upper neck, back, shoulders.     Last visit was prescribed fludrocortisone for her orthostatic hypotension but did not take. She sill has symptoms of orthostatic hypotension at work where she gets dizzy.    Review of Systems:   Review of Systems   All other systems reviewed and are negative.      Past Medical History:   Past Medical History:   Diagnosis Date    Anxiety     Asthma     Depression     Dizziness     Fibromyalgia     Headache     Heart disease     POTS (postural orthostatic tachycardia syndrome)     Seasonal allergies        Past Surgical History:   Past Surgical History:   Procedure Laterality Date     SECTION      CHOLECYSTECTOMY      TUBAL ABDOMINAL LIGATION      TUMOR REMOVAL      NECK    TUMOR REMOVAL         Family History:   Family History   Problem Relation Age of Onset    Thyroid disease Mother     Diabetes Mother     Aneurysm Mother     Mental illness Father     Alcohol abuse Father     Arthritis Father     Diabetes Father     Thyroid disease Father     Sickle cell anemia Father     Migraines Father        Social History:   Social History     Socioeconomic History    Marital status: Single   Tobacco Use    Smoking status: Every Day     Current packs/day: 0.50     Types: Cigarettes    Smokeless tobacco: Never    Tobacco comments:     social    Vaping Use    Vaping status: Never Used   Substance and Sexual Activity    Alcohol use: Not Currently    Drug use: Yes     Types: Marijuana     Comment: OCCASIONAL- NOT IN 6+MONTHS    Sexual activity: Yes     Partners: Male       Immunizations:   Immunization History   Administered Date(s) Administered    COVID-19 (PFIZER) Purple Cap Monovalent 06/21/2021, 07/23/2021    Flublok 18+yrs 01/29/2022    Fluzone (or Fluarix & Flulaval for VFC) >6mos 09/17/2018    Hep B, Adolescent or Pediatric 07/08/2003, 08/05/2003    Influenza Quad Vaccine (Inpatient) 01/29/2016    MMR 07/08/2003    Pneumococcal Polysaccharide (PPSV23) 01/01/2015    Td (TDVAX) 07/08/2003    Tdap 01/01/2015, 03/29/2016        Medications:     Current Outpatient Medications:     cyclobenzaprine (FLEXERIL) 10 MG tablet, Take 1 tablet by mouth 3 (Three) Times a Day As Needed for Muscle Spasms., Disp: 90 tablet, Rfl: 1    fexofenadine (Allegra Allergy) 60 MG tablet, Take 1 tablet by mouth Daily., Disp: 30 tablet, Rfl: 11    fludrocortisone 0.1 MG tablet, Take 1 tablet by mouth Daily As Needed (dizziness). For dizziness/light-headedness, Disp: 10 tablet, Rfl: 0    gabapentin (NEURONTIN) 400 MG capsule, Take 1 capsule by mouth 2 (Two) Times a Day., Disp: 60 capsule, Rfl: 2    ondansetron ODT (ZOFRAN-ODT) 4 MG disintegrating tablet, Take 2 tablets by mouth Every 8 (Eight) Hours As Needed., Disp: , Rfl:     SUMAtriptan (IMITREX) 100 MG tablet, Take 1 tablet by mouth 1 (One) Time., Disp: , Rfl:     [START ON 6/24/2024] HYDROcodone-acetaminophen (NORCO)  MG per tablet, Take 1 tablet by mouth Every 8 (Eight) Hours As Needed for Severe Pain for up to 30 days., Disp: 90 tablet, Rfl: 0    levocetirizine (XYZAL) 5 MG tablet, Take 1 tablet by mouth Every Evening for 30 days., Disp: 30 tablet, Rfl: 6    methylPREDNISolone (MEDROL) 4 MG dose pack, Take as directed on package instructions. (Patient not taking: Reported on 5/23/2024), Disp: 21 tablet, Rfl: 0     "midodrine (PROAMATINE) 2.5 MG tablet, Take 1 tablet by mouth 2 (Two) Times a Day. (Patient not taking: Reported on 6/6/2024), Disp: , Rfl:     sodium chloride 1 g tablet, Take 1 tablet by mouth 2 (Two) Times a Day., Disp: 60 tablet, Rfl: 2    Allergies:   Allergies   Allergen Reactions    Penicillins Hives     Pt reports allergy to penicillin today, noted she has been    Latex Rash       Objective     Vital Signs  BP (!) 86/58   Pulse 92   Temp 96.4 °F (35.8 °C) (Temporal)   Ht 161.9 cm (63.74\")   Wt 71.2 kg (157 lb)   SpO2 100%   BMI 27.17 kg/m²   Estimated body mass index is 27.17 kg/m² as calculated from the following:    Height as of this encounter: 161.9 cm (63.74\").    Weight as of this encounter: 71.2 kg (157 lb).            Physical Exam  Constitutional:       Appearance: Normal appearance.   Cardiovascular:      Rate and Rhythm: Normal rate and regular rhythm.      Pulses: Normal pulses.      Heart sounds: Normal heart sounds.   Pulmonary:      Effort: Pulmonary effort is normal.      Breath sounds: Normal breath sounds.   Neurological:      Mental Status: She is alert.          Result Review :                  Assessment and Plan     1. Chronic neuropathic pain  Reviewed gaurav,   Taks Norco 10 TID for pain, muscle relaxants PRN. Gabapentin  UDS today, Gaurav reviewed, patient compliant, has been dispensed appropriately.  Refill for next month prescribed,   Follow up 3 months,    2. Fibromyalgia  Pain regiment as above  Encourage daily exercise    3. Orthostatic hypotension  Recommend increase salt intake, leg elevation,   Recommend trial of fludrocortisone, consider midodrine at next vist.       Follow Up  Return in about 3 months (around 9/6/2024) for med refill.    Masood Valadez MD  [facility]  [facility]  [address] [facility]  [address] [address] [phone]    "

## 2024-06-20 DIAGNOSIS — M79.7 FIBROMYALGIA: ICD-10-CM

## 2024-06-20 DIAGNOSIS — G89.4 CHRONIC PAIN SYNDROME: ICD-10-CM

## 2024-06-21 RX ORDER — HYDROCODONE BITARTRATE AND ACETAMINOPHEN 10; 325 MG/1; MG/1
1 TABLET ORAL EVERY 8 HOURS PRN
Qty: 90 TABLET | Refills: 0 | Status: SHIPPED | OUTPATIENT
Start: 2024-06-24 | End: 2024-07-24

## 2024-07-17 ENCOUNTER — OFFICE VISIT (OUTPATIENT)
Dept: INTERNAL MEDICINE | Facility: CLINIC | Age: 34
End: 2024-07-17
Payer: COMMERCIAL

## 2024-07-17 ENCOUNTER — HOSPITAL ENCOUNTER (EMERGENCY)
Facility: HOSPITAL | Age: 34
Discharge: HOME | End: 2024-07-17
Attending: EMERGENCY MEDICINE
Payer: COMMERCIAL

## 2024-07-17 VITALS
HEIGHT: 63 IN | TEMPERATURE: 98.8 F | WEIGHT: 150 LBS | DIASTOLIC BLOOD PRESSURE: 73 MMHG | SYSTOLIC BLOOD PRESSURE: 118 MMHG | HEART RATE: 105 BPM | RESPIRATION RATE: 14 BRPM | OXYGEN SATURATION: 97 % | BODY MASS INDEX: 26.58 KG/M2

## 2024-07-17 VITALS
BODY MASS INDEX: 25.74 KG/M2 | HEIGHT: 64 IN | HEART RATE: 110 BPM | WEIGHT: 150.8 LBS | TEMPERATURE: 96.4 F | OXYGEN SATURATION: 100 % | DIASTOLIC BLOOD PRESSURE: 56 MMHG | SYSTOLIC BLOOD PRESSURE: 100 MMHG

## 2024-07-17 DIAGNOSIS — I95.1 ORTHOSTATIC HYPOTENSION: Primary | ICD-10-CM

## 2024-07-17 DIAGNOSIS — M79.7 FIBROMYALGIA: ICD-10-CM

## 2024-07-17 DIAGNOSIS — G89.4 CHRONIC PAIN SYNDROME: ICD-10-CM

## 2024-07-17 DIAGNOSIS — Z13.9 ENCOUNTER FOR MEDICAL SCREENING EXAMINATION: Primary | ICD-10-CM

## 2024-07-17 PROCEDURE — 1160F RVW MEDS BY RX/DR IN RCRD: CPT | Performed by: STUDENT IN AN ORGANIZED HEALTH CARE EDUCATION/TRAINING PROGRAM

## 2024-07-17 PROCEDURE — 99283 EMERGENCY DEPT VISIT LOW MDM: CPT

## 2024-07-17 PROCEDURE — 99214 OFFICE O/P EST MOD 30 MIN: CPT | Performed by: STUDENT IN AN ORGANIZED HEALTH CARE EDUCATION/TRAINING PROGRAM

## 2024-07-17 PROCEDURE — 1159F MED LIST DOCD IN RCRD: CPT | Performed by: STUDENT IN AN ORGANIZED HEALTH CARE EDUCATION/TRAINING PROGRAM

## 2024-07-17 RX ORDER — FLUDROCORTISONE ACETATE 0.1 MG/1
0.1 TABLET ORAL DAILY
Qty: 30 TABLET | Refills: 2 | Status: SHIPPED | OUTPATIENT
Start: 2024-07-17

## 2024-07-17 RX ORDER — RIMEGEPANT SULFATE 75 MG/75MG
75 TABLET, ORALLY DISINTEGRATING ORAL DAILY
COMMUNITY
Start: 2024-02-15

## 2024-07-17 RX ORDER — HYDROCODONE BITARTRATE AND ACETAMINOPHEN 10; 325 MG/1; MG/1
1 TABLET ORAL EVERY 8 HOURS PRN
Qty: 90 TABLET | Refills: 0 | Status: SHIPPED | OUTPATIENT
Start: 2024-07-24 | End: 2024-08-23

## 2024-07-17 NOTE — PROGRESS NOTES
Office Note     Name: Christa Sheppard    : 1990     MRN: [id number]     Chief Complaint  Hypotension (ER F/U NEEDS LETTER TO BE RELEASED TO RTW )    Subjective     History of Present Illness:  Christa Sheppard is a 33 y.o. female who presents today for     Patient here today for follow up from 2 emergency room visits in the past month  She was seen in the ED on , 2024 due to hypotension and syncope episodes.   Her first episode on 2024,  patient reports she was having low pressure around 100s and was feeling sick at the time. She was also having migraine headaches, so she went to the ED. At the Ed, she received migraine medicine and IV hydration which helped her symptoms. She required 2 liters of fluids, as her BP was in the systolic 80/50s, which improved to systolic 100/60s. She was d/c at the time in stable condition.   Recently she reports that she has been working at work in a hot humid location. She works at [employer] and was doing laborious work.  She reportedly lost conscious at work and was brought to the ED. She was given multiple liters of IV fluid in the ED and was supposed to observed for 24 hours period. She was started on fludrocortisone at the time in the hospital, however she felt like she did not want to be observed and left AMA    Today the patient reports she is feeling a little bit better. She needs a work note due to going to reduce work hours. She has been taking her salt tablets for hypotension, she was prescribed midodrine in the past but did not tolerate. She did not start the fludrocortisone yet. She has an appointment with her therapist tomorrow.      She needs refills for her fibromyalgia in which she takes norco 10s.         Review of Systems:   Review of Systems   All other systems reviewed and are negative.      Past Medical History:   Past Medical History:   Diagnosis Date    Anxiety     Asthma     Depression     Dizziness     Fibromyalgia      Headache     Heart disease     POTS (postural orthostatic tachycardia syndrome)     Seasonal allergies        Past Surgical History:   Past Surgical History:   Procedure Laterality Date     SECTION      CHOLECYSTECTOMY      TUBAL ABDOMINAL LIGATION      TUMOR REMOVAL      NECK    TUMOR REMOVAL         Family History:   Family History   Problem Relation Age of Onset    Thyroid disease Mother     Diabetes Mother     Aneurysm Mother     Mental illness Father     Alcohol abuse Father     Arthritis Father     Diabetes Father     Thyroid disease Father     Sickle cell anemia Father     Migraines Father        Social History:   Social History     Socioeconomic History    Marital status: Single   Tobacco Use    Smoking status: Every Day     Current packs/day: 0.50     Types: Cigarettes    Smokeless tobacco: Never    Tobacco comments:     social   Vaping Use    Vaping status: Never Used   Substance and Sexual Activity    Alcohol use: Not Currently    Drug use: Yes     Types: Marijuana     Comment: OCCASIONAL- NOT IN 6+MONTHS    Sexual activity: Yes     Partners: Male       Immunizations:   Immunization History   Administered Date(s) Administered    COVID-19 (PFIZER) Purple Cap Monovalent 2021, 2021    Flublok 18+yrs 2022    Fluzone (or Fluarix & Flulaval for VFC) >6mos 2018    Hep B, Adolescent or Pediatric 2003, 2003    Influenza Quad Vaccine (Inpatient) 2016    MMR 2003    Pneumococcal Polysaccharide (PPSV23) 2015    Td (TDVAX) 2003    Tdap 2015, 2016        Medications:     Current Outpatient Medications:     cyclobenzaprine (FLEXERIL) 10 MG tablet, Take 1 tablet by mouth 3 (Three) Times a Day As Needed for Muscle Spasms., Disp: 90 tablet, Rfl: 1    fexofenadine (Allegra Allergy) 60 MG tablet, Take 1 tablet by mouth Daily., Disp: 30 tablet, Rfl: 11    fludrocortisone 0.1 MG tablet, Take 1 tablet by mouth Daily. For dizziness/light-headedness,  "Disp: 30 tablet, Rfl: 2    gabapentin (NEURONTIN) 400 MG capsule, Take 1 capsule by mouth 2 (Two) Times a Day., Disp: 60 capsule, Rfl: 2    [START ON 7/24/2024] HYDROcodone-acetaminophen (NORCO)  MG per tablet, Take 1 tablet by mouth Every 8 (Eight) Hours As Needed for Severe Pain for up to 30 days., Disp: 90 tablet, Rfl: 0    ondansetron ODT (ZOFRAN-ODT) 4 MG disintegrating tablet, Take 2 tablets by mouth Every 8 (Eight) Hours As Needed., Disp: , Rfl:     Rimegepant Sulfate (Nurtec) 75 MG tablet dispersible tablet, Take 1 tablet by mouth Daily., Disp: , Rfl:     sodium chloride 1 g tablet, Take 1 tablet by mouth 2 (Two) Times a Day., Disp: 60 tablet, Rfl: 2    SUMAtriptan (IMITREX) 100 MG tablet, Take 1 tablet by mouth 1 (One) Time., Disp: , Rfl:     Allergies:   Allergies   Allergen Reactions    Penicillins Hives     Pt reports allergy to penicillin today, noted she has been    Latex Rash       Objective     Vital Signs  /56   Pulse 110   Temp 96.4 °F (35.8 °C) (Temporal)   Ht 161.9 cm (63.74\")   Wt 68.4 kg (150 lb 12.8 oz)   SpO2 100%   BMI 26.10 kg/m²   Estimated body mass index is 26.1 kg/m² as calculated from the following:    Height as of this encounter: 161.9 cm (63.74\").    Weight as of this encounter: 68.4 kg (150 lb 12.8 oz).            Physical Exam  Constitutional:       Appearance: Normal appearance.   Cardiovascular:      Rate and Rhythm: Normal rate and regular rhythm.      Pulses: Normal pulses.      Heart sounds: Normal heart sounds.   Pulmonary:      Effort: Pulmonary effort is normal.      Breath sounds: Normal breath sounds.   Neurological:      Mental Status: She is alert.          Result Review :                  Assessment and Plan     1. Orthostatic hypotension  Reviewed ED records from 6/27/2024 and 7/12/2024  Vitals stable today, recommend patient to follow up with Cardiology  Recommend to start the fludrocortisone,   Recommend 2-3g NaCl supplementation and fluid " hydration, compression stockings  Work note provided.       - fludrocortisone 0.1 MG tablet; Take 1 tablet by mouth Daily. For dizziness/light-headedness  Dispense: 30 tablet; Refill: 2    2. Chronic pain syndrome  Stable, med refill, gaurav reviewed  - HYDROcodone-acetaminophen (NORCO)  MG per tablet; Take 1 tablet by mouth Every 8 (Eight) Hours As Needed for Severe Pain for up to 30 days.  Dispense: 90 tablet; Refill: 0    3. Fibromyalgia  Stable, med gaurav lopez reivewed  - HYDROcodone-acetaminophen (NORCO)  MG per tablet; Take 1 tablet by mouth Every 8 (Eight) Hours As Needed for Severe Pain for up to 30 days.  Dispense: 90 tablet; Refill: 0       Follow Up  No follow-ups on file.    Masood Valadez MD  [facility]  [facility]  [address]  [address] [id number] [phone]

## 2024-07-17 NOTE — Clinical Note
July 17, 2024     Patient: Christa Sheppard   YOB: 1990   Date of Visit: 7/17/2024       To Whom It May Concern:    It is my medical opinion that Christa Sheppard may return to work in three days.            Sincerely,        Masood Valadez MD    CC: No Recipients

## 2024-07-17 NOTE — Clinical Note
July 17, 2024     Patient: Christa Sheppard   YOB: 1990   Date of Visit: 7/17/2024       To Whom It May Concern:    It is my medical opinion that Christa Sheppard may return to work in two days.         Sincerely,        Masood Valadez MD    CC: No Recipients

## 2024-07-17 NOTE — Clinical Note
July 17, 2024     Patient: Christa Sheppard   YOB: 1990   Date of Visit: 7/17/2024       To Whom it May Concern:    Christa Sheppard was seen in my clinic on 7/17/2024. She may return to school in three days.         Sincerely,          Masood Valadez MD        CC: No Recipients

## 2024-07-17 NOTE — LETTER
July 17, 2024     Patient: Christa Sheppard   YOB: 1990   Date of Visit: 7/17/2024       To Whom It May Concern:    It is my medical opinion that Christa Sheppard may return to light duty immediately with the following restrictions: She will need to be restricted to 24 hours per week.  , She will need duties that allow her to remain one work setting area, she is restricted from any work duties that expose her to outside heat elements. She is restricted from lifting more than 20lbs.            Sincerely,        Masood Valadez MD    CC: No Recipients

## 2024-07-17 NOTE — Clinical Note
July 17, 2024     Patient: Christa Sheppard   YOB: 1990   Date of Visit: 7/17/2024       To Whom it May Concern:    Christa Sheppard was seen in my clinic on 7/17/2024. She may return to school in two days.         Sincerely,          Masood Valadez MD        CC: No Recipients

## 2024-07-17 NOTE — Clinical Note
July 17, 2024     Patient: Christa Sheppard   YOB: 1990   Date of Visit: 7/17/2024       To Whom It May Concern:    It is my medical opinion that Christa Sheppard may return to work in one day.            Sincerely,        Masood Valadez MD    CC: No Recipients

## 2024-07-18 NOTE — DISCHARGE INSTRUCTIONS
Symptomatic care is recommended. Take all medications as prescribed and instructed. Follow up with behavioral health and primary care as directed or return to Emergency Department with worsening of symptoms.

## 2024-07-18 NOTE — ED PROVIDER NOTES
EMERGENCY DEPARTMENT ENCOUNTER    Pt Name: Christa Sheppard  MRN: [id number]  Pt :   1990  Room Number:    Date of encounter:  2024  PCP: Masood Valadez MD  ED Provider: DARCIE Ybarra    Historian: Patient    HPI:  Chief Complaint: Stress    Context: Christa Sheppard is a 33 y.o. female who presents to the ED c/o overwhelming stress within her home. Patient with history of PTSD from the death of her father. She reports that she has had increased stress within her home and was fighting with her  today. She reports that her  kept aggravating her and that she tried several coping mechanisms including self harm as she cut herself. Patient reports long history of personal stressors and difficulty coping with stressful situations. Patient reported that things just became overwhelming today and she made the comment that she should just kill herself after her  told her the same thing. On interview and exam patient has calmed down and has no active thoughts of suicide or homicide. She shares she has a therapist and has an appointment tomorrow. Patient does not wish to stay in the ER or be evaluated further as is causing increased stressed and anxiety and flashbacks of her father's death. Patient without any additional complaints on interview and exam.   HPI     REVIEW OF SYSTEMS  A chief complaint appropriate review of systems was completed and is negative except as noted in the HPI.     PAST MEDICAL HISTORY  Past Medical History:   Diagnosis Date    Anxiety     Asthma     Depression     Dizziness     Fibromyalgia     Headache     Heart disease     POTS (postural orthostatic tachycardia syndrome)     Seasonal allergies        PAST SURGICAL HISTORY  Past Surgical History:   Procedure Laterality Date     SECTION      CHOLECYSTECTOMY      TUBAL ABDOMINAL LIGATION      TUMOR REMOVAL      NECK    TUMOR REMOVAL         FAMILY HISTORY  Family History   Problem Relation  Age of Onset    Thyroid disease Mother     Diabetes Mother     Aneurysm Mother     Mental illness Father     Alcohol abuse Father     Arthritis Father     Diabetes Father     Thyroid disease Father     Sickle cell anemia Father     Migraines Father        SOCIAL HISTORY  Social History     Socioeconomic History    Marital status: Single   Tobacco Use    Smoking status: Every Day     Current packs/day: 0.50     Types: Cigarettes    Smokeless tobacco: Never    Tobacco comments:     social   Vaping Use    Vaping status: Never Used   Substance and Sexual Activity    Alcohol use: Not Currently    Drug use: Yes     Types: Marijuana     Comment: OCCASIONAL- NOT IN 6+MONTHS    Sexual activity: Yes     Partners: Male       ALLERGIES  Penicillins and Latex    PHYSICAL EXAM  Physical Exam  Vitals and nursing note reviewed.   Constitutional:       General: She is not in acute distress.     Appearance: Normal appearance. She is not ill-appearing or toxic-appearing.   HENT:      Head: Normocephalic and atraumatic.      Nose: Nose normal.      Mouth/Throat:      Mouth: Mucous membranes are moist.   Eyes:      Extraocular Movements: Extraocular movements intact.   Cardiovascular:      Rate and Rhythm: Normal rate.   Pulmonary:      Effort: Pulmonary effort is normal.   Abdominal:      General: There is no distension.   Musculoskeletal:         General: Normal range of motion.      Cervical back: Normal range of motion.   Skin:     General: Skin is warm and dry.      Comments: Patient with superficial scratch on right forearm from cutting attempt earlier this evening.    Neurological:      General: No focal deficit present.      Mental Status: She is alert.   Psychiatric:         Mood and Affect: Mood normal.         Speech: Speech normal.         Behavior: Behavior normal. Behavior is cooperative.         Thought Content: Thought content does not include homicidal or suicidal ideation. Thought content does not include homicidal or  suicidal plan.       LAB RESULTS    If labs were ordered, I independently reviewed the results and considered them in treating the patient.    RADIOLOGY  No orders to display     [] Radiologist's Report Reviewed:  I ordered and independently interpreted the above noted radiographic studies.  See radiologist's dictation for official interpretation.      PROCEDURES    Procedures    No orders to display       MEDICATIONS GIVEN IN ER    Medications - No data to display    MEDICAL DECISION MAKING, PROGRESS, and CONSULTS   Medical Decision Making  33-year-old nontoxic-appearing female presents to ED following arguments in her home with her  and stating thoughts of self-harm. No acute or emergent findings demonstrated on physical exam. Patient is calm, collected and answering all questions appropriately. She denies current SI/HI and does not wish to be evaluated in the emergency department as it is making her PTSD worse. Patient has follow up scheduled with her therapist tomorrow and a safe disposition plan this evening. Patient discharged with follow up as scheduled with behavioral health tomorrow, return precautions provided.     Problems Addressed:  Encounter for medical screening examination: acute illness or injury      All labs have been independently reviewed by me.  All radiology studies have been interpreted by me and the radiologist dictating the report.  All EKG's have been independently interpreted by me as well as overseeing attending physician.    [] Discussed with radiology regarding test interpretation:    Discussion below represents my analysis of pertinent findings related to patient's condition, differential diagnosis, treatment plan and final disposition.    Differential diagnosis:  The differential diagnosis associated with the patient's presentation includes: Suicidal, homicidal, PTSD    Additional sources  Discussed/ obtained information from independent historians:   [] Spouse  [] Parent  []  Family member  [] Friend  [x] EMS   [] Other:  External (non-ED) record review:   [] Inpatient record:   [] Office record:   [] Outpatient record:   [] Prior Outpatient labs:   [] Prior Outpatient radiology:   [] Primary Care record:   [] Outside ED record:   [] Other:   Patient's care impacted by:   [] Diabetes  [] Hypertension  [] Hyperlipidemia  [] Hypothyroidism   [] Coronary Artery Disease   [] COPD   [] Cancer   [] Obesity  [] GERD   [] Tobacco Abuse   [] Substance Abuse    [] Anxiety   [] Depression   [] Other:   Care significantly affected by Social Determinants of Health (housing and economic circumstances, unemployment)    [] Yes     [x] No   If yes, Patient's care significantly limited by  Social Determinants of Health including:   [] Inadequate housing   [] Low income   [] Alcoholism and drug addiction in family   [] Problems related to primary support group   [] Unemployment   [] Problems related to employment   [] Other Social Determinants of Health:     Shared decision making:  I had a discussion with the patient/family regarding diagnosis, diagnostic results, treatment plan, and medications.  The patient/family indicated understanding of these instructions.  I spent adequate time at the bedside preceding discharge necessary to personally discuss the aftercare instructions, giving patient education, providing explanations of the results of our evaluations/findings, and my decision making to assure that the patient/family understand the plan of care.  Time was allotted to answer questions at that time and throughout the ED course.  Emphasis was placed on timely follow-up after discharge.  I also discussed the potential for the development of an acute emergent condition requiring further evaluation, admission, or even surgical intervention. I discussed that we found nothing during the visit today indicating the need for further workup, admission, or the presence of an unstable medical condition.  I  encouraged the patient to return to the emergency department immediately for ANY concerns, worsening, new complaints, or if symptoms persist and unable to seek follow-up in a timely fashion.  The patient/family expressed understanding and agreement with this plan.  The patient will follow-up with behavioral health tomorrow as scheduled for reevaluation.      Orders placed during this visit:  No orders of the defined types were placed in this encounter.    ED Course:    ED Course as of 07/18/24 0144   Thu Jul 18, 2024   0123 Vitals and Telemetry tracing was reviewed and directly interpreted by myself demonstrating blood pressure 118/73, afebrile, heart rate 105, respirations 14 breaths/min and oxygen saturation 97% on room air [JG]   0124 BP: 118/73 [JG]   0124 Temp: 98.8 °F (37.1 °C) [JG]   0124 Heart Rate: 105 [JG]   0124 Resp: 14 [JG]   0124 SpO2: 97 % [JG]      ED Course User Index  [JG] Bertrand Han PA            DIAGNOSIS  Final diagnoses:   Encounter for medical screening examination       DISPOSITION    ED Disposition       ED Disposition   Discharge    Condition   Stable    Comment   --               Please note that portions of this document were completed with voice recognition software.        Bertrand Han PA  07/18/24 0144

## 2024-07-24 ENCOUNTER — OFFICE VISIT (OUTPATIENT)
Dept: INTERNAL MEDICINE | Facility: CLINIC | Age: 34
End: 2024-07-24
Payer: COMMERCIAL

## 2024-07-24 VITALS
TEMPERATURE: 97.7 F | HEART RATE: 90 BPM | SYSTOLIC BLOOD PRESSURE: 94 MMHG | OXYGEN SATURATION: 100 % | WEIGHT: 155.8 LBS | DIASTOLIC BLOOD PRESSURE: 72 MMHG | BODY MASS INDEX: 27.6 KG/M2

## 2024-07-24 DIAGNOSIS — M79.7 FIBROMYALGIA: ICD-10-CM

## 2024-07-24 DIAGNOSIS — I95.1 ORTHOSTATIC HYPOTENSION: Primary | ICD-10-CM

## 2024-07-24 DIAGNOSIS — G89.4 CHRONIC PAIN SYNDROME: ICD-10-CM

## 2024-07-24 PROCEDURE — 1159F MED LIST DOCD IN RCRD: CPT | Performed by: STUDENT IN AN ORGANIZED HEALTH CARE EDUCATION/TRAINING PROGRAM

## 2024-07-24 PROCEDURE — 1160F RVW MEDS BY RX/DR IN RCRD: CPT | Performed by: STUDENT IN AN ORGANIZED HEALTH CARE EDUCATION/TRAINING PROGRAM

## 2024-07-24 PROCEDURE — 1125F AMNT PAIN NOTED PAIN PRSNT: CPT | Performed by: STUDENT IN AN ORGANIZED HEALTH CARE EDUCATION/TRAINING PROGRAM

## 2024-07-24 PROCEDURE — 99214 OFFICE O/P EST MOD 30 MIN: CPT | Performed by: STUDENT IN AN ORGANIZED HEALTH CARE EDUCATION/TRAINING PROGRAM

## 2024-07-24 NOTE — PROGRESS NOTES
Office Note     Name: Christa Sheppard    : 1990     MRN: [id number]     Chief Complaint  Hypotension    Subjective     History of Present Illness:  Christa Sheppard is a 33 y.o. female who presents today for     Here for paperwork to be filled out for ADA and her work.    Orthostatic hypotension  This has been a chronic problems, causing her to miss work due to near syncope and syncope events. Has issues with prolonged standing, in which she gets light-headed. She has been increasing her fluid intake, she recently started taking fludrocortisone. She has tried midodrine in th past with no improvement.     Chronic pain, fibromyalgia, a  She is currenty takes Norco 10-325mg daily, this has been a chronic condition. With this dose, she is able to function at her job, she works at [employer]. She is unable to lift more than >20 lbs at work. Her pain is manageable where is able to move. She does have her bad day still. Takes Flexeril PRN  Pain is upper neck, back, shoulders.       Review of Systems:   Review of Systems   All other systems reviewed and are negative.      Past Medical History:   Past Medical History:   Diagnosis Date    Anxiety     Asthma     Depression     Dizziness     Fibromyalgia     Headache     Heart disease     POTS (postural orthostatic tachycardia syndrome)     Seasonal allergies        Past Surgical History:   Past Surgical History:   Procedure Laterality Date     SECTION      CHOLECYSTECTOMY      TUBAL ABDOMINAL LIGATION      TUMOR REMOVAL      NECK    TUMOR REMOVAL         Family History:   Family History   Problem Relation Age of Onset    Thyroid disease Mother     Diabetes Mother     Aneurysm Mother     Mental illness Father     Alcohol abuse Father     Arthritis Father     Diabetes Father     Thyroid disease Father     Sickle cell anemia Father     Migraines Father        Social History:   Social History     Socioeconomic History    Marital status: Single    Tobacco Use    Smoking status: Every Day     Current packs/day: 0.50     Types: Cigarettes    Smokeless tobacco: Never    Tobacco comments:     social   Vaping Use    Vaping status: Never Used   Substance and Sexual Activity    Alcohol use: Not Currently    Drug use: Yes     Types: Marijuana     Comment: OCCASIONAL- NOT IN 6+MONTHS    Sexual activity: Yes     Partners: Male       Immunizations:   Immunization History   Administered Date(s) Administered    COVID-19 (PFIZER) Purple Cap Monovalent 06/21/2021, 07/23/2021    Flublok 18+yrs 01/29/2022    Fluzone (or Fluarix & Flulaval for VFC) >6mos 09/17/2018    Hep B, Adolescent or Pediatric 07/08/2003, 08/05/2003    Influenza Quad Vaccine (Inpatient) 01/29/2016    MMR 07/08/2003    Pneumococcal Polysaccharide (PPSV23) 01/01/2015    Td (TDVAX) 07/08/2003    Tdap 01/01/2015, 03/29/2016        Medications:     Current Outpatient Medications:     cyclobenzaprine (FLEXERIL) 10 MG tablet, Take 1 tablet by mouth 3 (Three) Times a Day As Needed for Muscle Spasms., Disp: 90 tablet, Rfl: 1    fexofenadine (Allegra Allergy) 60 MG tablet, Take 1 tablet by mouth Daily., Disp: 30 tablet, Rfl: 11    fludrocortisone 0.1 MG tablet, Take 1 tablet by mouth Daily. For dizziness/light-headedness, Disp: 30 tablet, Rfl: 2    gabapentin (NEURONTIN) 400 MG capsule, Take 1 capsule by mouth 2 (Two) Times a Day., Disp: 60 capsule, Rfl: 2    HYDROcodone-acetaminophen (NORCO)  MG per tablet, Take 1 tablet by mouth Every 8 (Eight) Hours As Needed for Severe Pain for up to 30 days., Disp: 90 tablet, Rfl: 0    ondansetron ODT (ZOFRAN-ODT) 4 MG disintegrating tablet, Take 2 tablets by mouth Every 8 (Eight) Hours As Needed., Disp: , Rfl:     Rimegepant Sulfate (Nurtec) 75 MG tablet dispersible tablet, Take 1 tablet by mouth Daily., Disp: , Rfl:     sodium chloride 1 g tablet, Take 1 tablet by mouth 2 (Two) Times a Day., Disp: 60 tablet, Rfl: 2    SUMAtriptan (IMITREX) 100 MG tablet, Take 1  "tablet by mouth 1 (One) Time., Disp: , Rfl:     Allergies:   Allergies   Allergen Reactions    Penicillins Hives     Pt reports allergy to penicillin today, noted she has been    Latex Rash       Objective     Vital Signs  BP 94/72   Pulse 90   Temp 97.7 °F (36.5 °C) (Infrared)   Wt 70.7 kg (155 lb 12.8 oz)   SpO2 100%   BMI 27.60 kg/m²   Estimated body mass index is 27.6 kg/m² as calculated from the following:    Height as of 7/17/24: 160 cm (63\").    Weight as of this encounter: 70.7 kg (155 lb 12.8 oz).            Physical Exam  Constitutional:       Appearance: Normal appearance.   Cardiovascular:      Rate and Rhythm: Normal rate and regular rhythm.      Pulses: Normal pulses.      Heart sounds: Normal heart sounds.   Pulmonary:      Effort: Pulmonary effort is normal.      Breath sounds: Normal breath sounds.   Neurological:      Mental Status: She is alert.          Result Review :                  Assessment and Plan     1. Orthostatic hypotension  Stable today,  Started recently fludrocortisone   Continue with hydration, salt intake, frequent breaks  ADA, work formes filmed    2. Fibromyalgia  Stable,  Meds refilled 7/24  3. Chronic pain syndrome  Stable,  Meds refilled 7/24       Follow Up  No follow-ups on file.    MD MELVA BarillasE [address]  [facility]  [address]  [address] [address] [phone]    "

## 2024-07-29 RX ORDER — SUMATRIPTAN 100 MG/1
100 TABLET, FILM COATED ORAL ONCE
Qty: 1 TABLET | Refills: 0 | Status: SHIPPED | OUTPATIENT
Start: 2024-07-29 | End: 2024-07-29

## 2024-07-29 RX ORDER — RIMEGEPANT SULFATE 75 MG/75MG
75 TABLET, ORALLY DISINTEGRATING ORAL DAILY
Qty: 30 TABLET | Refills: 2 | Status: SHIPPED | OUTPATIENT
Start: 2024-07-29 | End: 2024-07-31 | Stop reason: SDUPTHER

## 2024-07-29 RX ORDER — ONDANSETRON 4 MG/1
8 TABLET, ORALLY DISINTEGRATING ORAL EVERY 8 HOURS PRN
Qty: 90 TABLET | Refills: 0 | Status: SHIPPED | OUTPATIENT
Start: 2024-07-29

## 2024-07-29 NOTE — TELEPHONE ENCOUNTER
REFILL REQUEST ONDANSETRON UNKNOWN PROVIDER   LAST REFILL 3/6/24    SUMATRIPTAN   LAST REFILL 3/15/24 UNKNOWN PROVIDER       RIMEGEPANT   LAST REFILL 2/15/24 UNKNOWN PROVIDER   LAST VISIT 7/24/24  NEXT VISIT 8/28/24

## 2024-07-30 ENCOUNTER — PRIOR AUTHORIZATION (OUTPATIENT)
Dept: INTERNAL MEDICINE | Facility: CLINIC | Age: 34
End: 2024-07-30
Payer: COMMERCIAL

## 2024-07-30 NOTE — TELEPHONE ENCOUNTER
Insurance denied the 30 day fill of nurtec.  Will try filling for the 9 days and no imitrex.  PA appeal number is [phone].

## 2024-07-30 NOTE — TELEPHONE ENCOUNTER
PA submitted in nurtec.  KEY: [id number]  Spoke with patient and she states takes the nurtec daily and the imitrex as needed.

## 2024-07-30 NOTE — TELEPHONE ENCOUNTER
Called luan and initiated a new PA with Gaye for 16 tablets for 30 days.  Patient's name on her prescription card is Svitlana.  She may need to fix this. Spoke with Gaye SOTELO

## 2024-07-31 DIAGNOSIS — G43.909 MIGRAINE WITHOUT STATUS MIGRAINOSUS, NOT INTRACTABLE, UNSPECIFIED MIGRAINE TYPE: Primary | ICD-10-CM

## 2024-07-31 RX ORDER — RIMEGEPANT SULFATE 75 MG/75MG
75 TABLET, ORALLY DISINTEGRATING ORAL EVERY OTHER DAY
Qty: 30 TABLET | Refills: 2 | Status: SHIPPED | OUTPATIENT
Start: 2024-07-31

## 2024-08-02 NOTE — TELEPHONE ENCOUNTER
Patient's insurance ended on 7/31/24.  Patient has been notified of the insurance ending and will call the plan.

## 2024-08-18 DIAGNOSIS — G43.909 MIGRAINE WITHOUT STATUS MIGRAINOSUS, NOT INTRACTABLE, UNSPECIFIED MIGRAINE TYPE: ICD-10-CM

## 2024-08-18 DIAGNOSIS — G89.4 CHRONIC PAIN SYNDROME: ICD-10-CM

## 2024-08-18 DIAGNOSIS — M79.7 FIBROMYALGIA: ICD-10-CM

## 2024-08-18 DIAGNOSIS — I95.1 ORTHOSTATIC HYPOTENSION: ICD-10-CM

## 2024-08-19 RX ORDER — FLUDROCORTISONE ACETATE 0.1 MG/1
0.1 TABLET ORAL DAILY
Qty: 30 TABLET | Refills: 2 | Status: SHIPPED | OUTPATIENT
Start: 2024-08-19

## 2024-08-19 RX ORDER — CYCLOBENZAPRINE HCL 10 MG
10 TABLET ORAL 3 TIMES DAILY PRN
Qty: 90 TABLET | Refills: 1 | Status: SHIPPED | OUTPATIENT
Start: 2024-08-19

## 2024-08-19 RX ORDER — GABAPENTIN 400 MG/1
400 CAPSULE ORAL 2 TIMES DAILY
Qty: 60 CAPSULE | Refills: 2 | Status: SHIPPED | OUTPATIENT
Start: 2024-08-19

## 2024-08-19 RX ORDER — ONDANSETRON 4 MG/1
8 TABLET, ORALLY DISINTEGRATING ORAL EVERY 8 HOURS PRN
Qty: 90 TABLET | Refills: 0 | Status: SHIPPED | OUTPATIENT
Start: 2024-08-19

## 2024-08-19 RX ORDER — RIMEGEPANT SULFATE 75 MG/75MG
75 TABLET, ORALLY DISINTEGRATING ORAL EVERY OTHER DAY
Qty: 30 TABLET | Refills: 2 | Status: SHIPPED | OUTPATIENT
Start: 2024-08-19

## 2024-08-19 RX ORDER — HYDROCODONE BITARTRATE AND ACETAMINOPHEN 10; 325 MG/1; MG/1
1 TABLET ORAL EVERY 8 HOURS PRN
Qty: 90 TABLET | Refills: 0 | Status: SHIPPED | OUTPATIENT
Start: 2024-08-19 | End: 2024-09-18

## 2024-08-19 RX ORDER — SUMATRIPTAN 100 MG/1
100 TABLET, FILM COATED ORAL ONCE
Qty: 1 TABLET | Refills: 0 | Status: SHIPPED | OUTPATIENT
Start: 2024-08-19 | End: 2024-08-19

## 2024-08-19 NOTE — TELEPHONE ENCOUNTER
REFILL REQUEST CYCLOBENZAPRINE   LAST REFILL 5/23/24    GABAPENTIN   LAST REFILL 5/23/24    FLUDROCORTISONE   LAST REFILL 7/17/24    HYDROCODONE   LAST REFILL 7/24/24    ONDANSETRON   LAST REFILL 7/29/24    SUMATRIPTAN   LAST REFILL 7/29/24    RIMEGEPANT TOO SOON TO REFILL   LAST REFILL 7/31/24  LAST VISIT 7/24/24  NEXT VISIT 8/29/24